# Patient Record
Sex: FEMALE | Race: WHITE | NOT HISPANIC OR LATINO | Employment: UNEMPLOYED | URBAN - NONMETROPOLITAN AREA
[De-identification: names, ages, dates, MRNs, and addresses within clinical notes are randomized per-mention and may not be internally consistent; named-entity substitution may affect disease eponyms.]

---

## 2024-09-21 ENCOUNTER — NON-PROVIDER VISIT (OUTPATIENT)
Dept: URGENT CARE | Facility: PHYSICIAN GROUP | Age: 34
End: 2024-09-21

## 2024-09-21 DIAGNOSIS — Z11.1 ENCOUNTER FOR TUBERCULIN SKIN TEST: ICD-10-CM

## 2024-09-21 PROCEDURE — 86580 TB INTRADERMAL TEST: CPT | Performed by: NURSE PRACTITIONER

## 2024-09-21 NOTE — PROGRESS NOTES
Hayley Stokes is a 34 y.o. female here for a non-provider visit for PPD placement -- Step 1 of 1    Reason for PPD:  work requirement    1. TB evaluation questionnaire completed by patient? Yes      -  If any answers marked yes did you contact a provider prior to placing? Not Indicated  2.  Patient notified to return to clinic for reading on: Monday 9/23/24 AFTER 1:52pm or Tuesday 9/24/24 BEFORE 1:52pm  3.  PPD Placement documentation completed on TB evaluation questionnaire? YES   4.  Location of TB evaluation questionnaire filed: TB folder

## 2024-09-23 ENCOUNTER — NON-PROVIDER VISIT (OUTPATIENT)
Dept: URGENT CARE | Facility: PHYSICIAN GROUP | Age: 34
End: 2024-09-23

## 2024-09-23 LAB — TB WHEAL 3D P 5 TU DIAM: NORMAL MM

## 2024-09-23 NOTE — PROGRESS NOTES
Hayley Stokes is a 34 y.o. female here for a non-provider visit for PPD reading -- Step 1 of 1.      1.  Resulted in Epic under enter/edit results? Yes   2.  TB evaluation questionnaire scanned into chart and original given to patient?Yes      3. Was induration greater than 0 mm? No.      Routed to PCP? No

## 2025-03-24 ENCOUNTER — OFFICE VISIT (OUTPATIENT)
Dept: URGENT CARE | Facility: PHYSICIAN GROUP | Age: 35
End: 2025-03-24

## 2025-03-24 VITALS
WEIGHT: 132 LBS | BODY MASS INDEX: 21.21 KG/M2 | HEIGHT: 66 IN | DIASTOLIC BLOOD PRESSURE: 60 MMHG | OXYGEN SATURATION: 97 % | SYSTOLIC BLOOD PRESSURE: 90 MMHG | TEMPERATURE: 98.2 F | HEART RATE: 81 BPM | RESPIRATION RATE: 14 BRPM

## 2025-03-24 DIAGNOSIS — J02.9 PHARYNGITIS, UNSPECIFIED ETIOLOGY: ICD-10-CM

## 2025-03-24 DIAGNOSIS — B95.0 BACTERIAL INFECTION DUE TO STREPTOCOCCUS, GROUP A: Primary | ICD-10-CM

## 2025-03-24 LAB — S PYO DNA SPEC NAA+PROBE: DETECTED

## 2025-03-24 PROCEDURE — 87651 STREP A DNA AMP PROBE: CPT | Performed by: NURSE PRACTITIONER

## 2025-03-24 PROCEDURE — 99203 OFFICE O/P NEW LOW 30 MIN: CPT | Performed by: NURSE PRACTITIONER

## 2025-03-24 PROCEDURE — 3074F SYST BP LT 130 MM HG: CPT | Performed by: NURSE PRACTITIONER

## 2025-03-24 PROCEDURE — 3078F DIAST BP <80 MM HG: CPT | Performed by: NURSE PRACTITIONER

## 2025-03-24 RX ORDER — VITAMIN B COMPLEX
1000 TABLET ORAL DAILY
COMMUNITY

## 2025-03-24 RX ORDER — AMOXICILLIN 875 MG/1
875 TABLET, COATED ORAL 2 TIMES DAILY
Qty: 20 TABLET | Refills: 0 | Status: SHIPPED | OUTPATIENT
Start: 2025-03-24 | End: 2025-04-03

## 2025-03-24 NOTE — PROGRESS NOTES
"Subjective:     Hayley Stokes is a 34 y.o. female who presents for Pharyngitis (x3days)      Pharyngitis       Pt presents for evaluation of a new problem. Hayley is a pleasant 34 year old female who presents to  today with complaints of a sore throat that started 3 days ago.  She denies any headache, congestion, cough or fever.  She has been using Tylenol for relief of her symptoms.  She states that she does work in a  and is concerned that she possibly has contracted strep throat.    ROS    PMH:   Past Medical History:   Diagnosis Date    Allergy     seosonal    Asthma     Asthma RX atrovent inhaler, uses PRN 7/24/2015     ALLERGIES: No Known Allergies  SURGHX:   Past Surgical History:   Procedure Laterality Date    CHOLECYSTECTOMY       SOCHX:   Social History     Socioeconomic History    Marital status:    Tobacco Use    Smoking status: Never   Substance and Sexual Activity    Alcohol use: No    Drug use: No     FH:   Family History   Problem Relation Age of Onset    Cancer Maternal Grandmother         breast         Objective:   BP 90/60   Pulse 81   Temp 36.8 °C (98.2 °F) (Temporal)   Resp 14   Ht 1.676 m (5' 6\")   Wt 59.9 kg (132 lb)   SpO2 97%   BMI 21.31 kg/m²     Physical Exam  Vitals and nursing note reviewed.   Constitutional:       General: She is not in acute distress.     Appearance: Normal appearance. She is normal weight. She is not ill-appearing or toxic-appearing.   HENT:      Head: Normocephalic.      Right Ear: External ear normal.      Left Ear: External ear normal.      Nose: No congestion or rhinorrhea.      Mouth/Throat:      Mouth: Mucous membranes are moist.      Pharynx: Uvula midline. Pharyngeal swelling, oropharyngeal exudate and posterior oropharyngeal erythema present.      Tonsils: Tonsillar exudate present. 2+ on the right. 2+ on the left.   Eyes:      General:         Right eye: No discharge.         Left eye: No discharge.      Pupils: Pupils are " equal, round, and reactive to light.   Pulmonary:      Effort: Pulmonary effort is normal.   Abdominal:      General: Abdomen is flat.   Musculoskeletal:         General: Normal range of motion.      Cervical back: Normal range of motion and neck supple.   Skin:     General: Skin is dry.   Neurological:      General: No focal deficit present.      Mental Status: She is alert and oriented to person, place, and time. Mental status is at baseline.   Psychiatric:         Mood and Affect: Mood normal.         Behavior: Behavior normal.         Thought Content: Thought content normal.         Judgment: Judgment normal.       Results for orders placed or performed in visit on 03/24/25   POCT CEPHEID GROUP A STREP - PCR    Collection Time: 03/24/25  1:08 PM   Result Value Ref Range    POC Group A Strep, PCR Detected (A) Not Detected, Invalid       Assessment/Plan:   Assessment    1. Bacterial infection due to streptococcus, group A  amoxicillin (AMOXIL) 875 MG tablet      2. Pharyngitis, unspecified etiology  POCT CEPHEID GROUP A STREP - PCR      Patient tested positive for strep throat.  Antibiotic was sent to preferred pharmacy.  Isolation guidelines discussed. We discussed supportive measures including humidifier, warm salt water gargles, over-the-counter Cepacol throat lozenges, rest  and increased fluids. Pt was encouraged to seek treatment back in the ER or urgent care for worsening symptoms,  fever greater than 100.5, wheezes or shortness of breath.

## 2025-03-24 NOTE — LETTER
March 24, 2025    To Whom It May Concern:         This is confirmation that Hayley Stokes attended her scheduled appointment with LEELEE Alfonso on 3/24/25.         If you have any questions please do not hesitate to call me at the phone number listed below.    Sincerely,          PAVITHRA Alfonso.  [phone]

## 2025-06-30 ENCOUNTER — OFFICE VISIT (OUTPATIENT)
Dept: URGENT CARE | Facility: PHYSICIAN GROUP | Age: 35
End: 2025-06-30

## 2025-06-30 VITALS
OXYGEN SATURATION: 97 % | BODY MASS INDEX: 21.95 KG/M2 | WEIGHT: 136 LBS | DIASTOLIC BLOOD PRESSURE: 62 MMHG | TEMPERATURE: 98.3 F | HEART RATE: 78 BPM | SYSTOLIC BLOOD PRESSURE: 110 MMHG | RESPIRATION RATE: 16 BRPM

## 2025-06-30 DIAGNOSIS — R05.1 ACUTE COUGH: ICD-10-CM

## 2025-06-30 DIAGNOSIS — J22 LRTI (LOWER RESPIRATORY TRACT INFECTION): Primary | ICD-10-CM

## 2025-06-30 DIAGNOSIS — R09.89 CHEST CONGESTION: ICD-10-CM

## 2025-06-30 DIAGNOSIS — R06.2 WHEEZING: ICD-10-CM

## 2025-06-30 RX ORDER — ALBUTEROL SULFATE 90 UG/1
2 INHALANT RESPIRATORY (INHALATION) EVERY 6 HOURS PRN
Qty: 8.5 G | Refills: 0 | Status: SHIPPED | OUTPATIENT
Start: 2025-06-30

## 2025-06-30 RX ORDER — DOXYCYCLINE HYCLATE 100 MG
100 TABLET ORAL 2 TIMES DAILY
Qty: 14 TABLET | Refills: 0 | Status: SHIPPED | OUTPATIENT
Start: 2025-06-30 | End: 2025-07-07

## 2025-06-30 NOTE — PROGRESS NOTES
Subjective:   Hayley Stokes is a 34 y.o. female who presents for Cough (Deep cough in chest. Over a week. )       HPI  Patient is a pleasant 34 y.o. who presents for evaluation of week history of frequent productive cough, chest congestion, wheezing, and intermittent shortness of breath.  Patient has known history of asthma, states symptoms feel similar.  Patient also states her children were recently ill with similar symptoms.  Has taken over-the-counter cold medication without relief.    ROS  All other systems are negative except as documented above within [facility].    MEDS: Current Medications[1]  ALLERGIES: Allergies[2]    Patient's PMH, SocHx, SurgHx, FamHx, Drug allergies and medications were reviewed.     Objective:   /62   Pulse 78   Temp 36.8 °C (98.3 °F) (Temporal)   Resp 16   Wt 61.7 kg (136 lb)   SpO2 97%   BMI 21.95 kg/m²     Physical Exam  Vitals and nursing note reviewed.   Constitutional:       General: She is awake.      Appearance: Normal appearance. She is well-developed.   HENT:      Head: Normocephalic and atraumatic.      Right Ear: Tympanic membrane, ear canal and external ear normal.      Left Ear: Tympanic membrane, ear canal and external ear normal.      Nose: Nose normal. No nasal tenderness, mucosal edema, congestion or rhinorrhea.      Right Turbinates: Enlarged.      Left Turbinates: Enlarged.      Mouth/Throat:      Lips: Pink.      Mouth: Mucous membranes are moist.      Pharynx: Oropharynx is clear. Uvula midline. Posterior oropharyngeal erythema present.   Eyes:      Extraocular Movements: Extraocular movements intact.      Conjunctiva/sclera: Conjunctivae normal.   Cardiovascular:      Rate and Rhythm: Normal rate and regular rhythm.      Pulses: Normal pulses.      Heart sounds: Normal heart sounds.   Pulmonary:      Effort: Pulmonary effort is normal.      Breath sounds: Examination of the right-lower field reveals rhonchi. Wheezing (throughout) and rhonchi present.    Musculoskeletal:         General: Normal range of motion.      Cervical back: Normal range of motion and neck supple.   Skin:     General: Skin is warm and dry.   Neurological:      General: No focal deficit present.      Mental Status: She is alert and oriented to person, place, and time.   Psychiatric:         Mood and Affect: Mood normal.         Behavior: Behavior normal. Behavior is cooperative.         Thought Content: Thought content normal.         Judgment: Judgment normal.         Assessment/Plan:   Assessment      1. LRTI (lower respiratory tract infection)  - doxycycline (VIBRAMYCIN) 100 MG Tab; Take 1 Tablet by mouth 2 times a day for 7 days.  Dispense: 14 Tablet; Refill: 0  - albuterol 108 (90 Base) MCG/ACT Aero Soln inhalation aerosol; Inhale 2 Puffs every 6 hours as needed for Shortness of Breath.  Dispense: 8.5 g; Refill: 0    2. Wheezing  - albuterol 108 (90 Base) MCG/ACT Aero Soln inhalation aerosol; Inhale 2 Puffs every 6 hours as needed for Shortness of Breath.  Dispense: 8.5 g; Refill: 0    3. Acute cough    4. Chest congestion      Vital signs stable at today's acute urgent care visit.  Begin medications as listed.     Advised the patient to follow-up with the primary care provider if symptoms persist.  Red flags discussed and indications to immediately call 911 or present to the ED. All questions were encouraged and answered to the patient's satisfaction and understanding, and they agree to the plan of care.     This is an acute problem with uncertain prognosis, medication management and instructions as well as management options were provided.  I personally reviewed prior external notes and test results pertinent to today and independently reviewed and interpreted all diagnostics, to include POCT testing. Time spent evaluating this patient includes preparing for visit, counseling/education, exam, evaluation, obtaining history, and ordering lab/test/procedures.      Please note that this  dictation was created using voice recognition software. I have made a reasonable attempt to correct obvious errors, but I expect that there are errors of grammar and possibly content that I did not discover before finalizing the note.           [1]   Current Outpatient Medications:     vitamin D3 (CHOLECALCIFEROL) 1000 Unit (25 mcg) Tab, Take 1,000 Units by mouth every day. (Patient not taking: Reported on 6/30/2025), Disp: , Rfl:     ibuprofen (MOTRIN) 800 MG Tab, Take 1 Tab by mouth every 8 hours as needed (Cramping). (Patient not taking: Reported on 6/30/2025), Disp: 30 Tab, Rfl: 3    albuterol (VENTOLIN OR PROVENTIL) 108 (90 BASE) MCG/ACT Aero Soln inhalation aerosol, Inhale 2 Puffs by mouth every 6 hours as needed for Shortness of Breath. (Patient not taking: Reported on 6/30/2025), Disp: 1 Inhaler, Rfl: 1    Prenatal Vit-Fe Fumarate-FA (PRENATAL VITAMIN) 27-0.8 MG TABS, Take  by mouth. (Patient not taking: Reported on 6/30/2025), Disp: , Rfl:     NS SOLN 60 mL with albuterol 2.5 mg/0.5 mL NEBU 5 mL, 5 mg/hr by Nebulization route. (Patient not taking: Reported on 6/30/2025), Disp: , Rfl:     ipratropium (ATROVENT) 17 MCG/ACT AERS, Inhale 2 Puffs by mouth every 6 hours. (Patient not taking: Reported on 6/30/2025), Disp: 17 g, Rfl: 3  [2] No Known Allergies